# Patient Record
Sex: FEMALE | Race: WHITE | NOT HISPANIC OR LATINO | Employment: PART TIME | URBAN - METROPOLITAN AREA
[De-identification: names, ages, dates, MRNs, and addresses within clinical notes are randomized per-mention and may not be internally consistent; named-entity substitution may affect disease eponyms.]

---

## 2024-08-21 ENCOUNTER — TELEPHONE (OUTPATIENT)
Dept: FAMILY MEDICINE | Facility: CLINIC | Age: 34
End: 2024-08-21

## 2024-08-21 ENCOUNTER — OFFICE VISIT (OUTPATIENT)
Dept: FAMILY MEDICINE | Facility: CLINIC | Age: 34
End: 2024-08-21
Payer: COMMERCIAL

## 2024-08-21 VITALS
BODY MASS INDEX: 21.52 KG/M2 | WEIGHT: 126.06 LBS | DIASTOLIC BLOOD PRESSURE: 72 MMHG | HEART RATE: 83 BPM | SYSTOLIC BLOOD PRESSURE: 102 MMHG | RESPIRATION RATE: 18 BRPM | OXYGEN SATURATION: 100 % | TEMPERATURE: 97.3 F | HEIGHT: 64 IN

## 2024-08-21 DIAGNOSIS — F41.8 DEPRESSION WITH ANXIETY: ICD-10-CM

## 2024-08-21 DIAGNOSIS — Z76.89 ENCOUNTER TO ESTABLISH CARE: Primary | ICD-10-CM

## 2024-08-21 PROCEDURE — 99203 OFFICE O/P NEW LOW 30 MIN: CPT | Performed by: INTERNAL MEDICINE

## 2024-08-21 RX ORDER — VENLAFAXINE HYDROCHLORIDE 150 MG/1
150 CAPSULE, EXTENDED RELEASE ORAL DAILY
COMMUNITY
Start: 2024-08-21

## 2024-08-21 RX ORDER — HYDROXYZINE HYDROCHLORIDE 25 MG/1
25 TABLET, FILM COATED ORAL DAILY PRN
COMMUNITY
Start: 2024-08-21

## 2024-08-21 ASSESSMENT — PAIN SCALES - GENERAL: PAINLEVEL: NO PAIN (0)

## 2024-08-21 NOTE — TELEPHONE ENCOUNTER
Please call patient. I received message from our neuropsych department that medical clinics do not do evaluation for courts. Court ordered evaluations falls under Forensic Psychology. Here is contact information from [place] Department of Human Services regarding forensic services including forensic mental health program:     Forensic Services General Assistance: [phone].  Forensic Mental Health Program: [phone].    She can contact them for guidance on where to get the test done.

## 2024-08-21 NOTE — PROGRESS NOTES
Assessment & Plan     Rosi was seen today for referral and establish care.    Diagnoses and all orders for this visit:    Encounter to establish care    Depression with anxiety  -     Adult Mental Health  Referral; Future      Patient only wanted referral for neuropsych testing.  Does not have any other primary care preventive needs.  She does not need anything else to be addressed.    No follow-up appointment scheduled.    Subjective   Rosi is a 33 year old, presenting for the following health issues:  Referral (Neuro-psychological) and Establish Care        8/21/2024     8:06 AM   Additional Questions   Roomed by Allison BURKS   Accompanied by self       Patient has a history of depression/anxiety, follows with psychiatry Tasia Middleton CNP.  She takes venlafaxine 150 mg daily.  Depression/anxiety well-controlled.  Uses hydroxyzine as needed for severe anxiety, she rarely uses it.  Through core parenting evaluation, there was a concern raised regarding autism.  The recommendation is for her to get for neuropsych testing including evaluation of autism.  Patient would like to get a referral.  She sees OB/GYN for preventative care.  She does not need medication refills.  She has no other concerns at this time.    History of Present Illness       Reason for visit:  To get an internal referral for a court ordered neuro-psychological assessment including for autism.    She eats 0-1 servings of fruits and vegetables daily.She consumes 3 sweetened beverage(s) daily.She exercises with enough effort to increase her heart rate 10 to 19 minutes per day.  She exercises with enough effort to increase her heart rate 3 or less days per week. She is missing 1 dose(s) of medications per week.  She is not taking prescribed medications regularly due to remembering to take.         Depression and Anxiety   How are you doing with your depression since your last visit? No change  How are you doing with your anxiety since your  "last visit?  No change  Are you having other symptoms that might be associated with depression or anxiety? No  Have you had a significant life event? Financial Concerns and Housing Concerns   Do you have any concerns with your use of alcohol or other drugs? No    Social History     Tobacco Use    Smoking status: Former     Types: Cigarettes     Passive exposure: Past    Smokeless tobacco: Never   Substance Use Topics    Alcohol use: Not Currently    Drug use: Never       Suicide Assessment Five-step Evaluation and Treatment (SAFE-T)    How many servings of fruits and vegetables do you eat daily?  2-3  On average, how many sweetened beverages do you drink each day (Examples: soda, juice, sweet tea, etc.  Do NOT count diet or artificially sweetened beverages)?   6  How many days per week do you exercise enough to make your heart beat faster? 3 or less  How many minutes a day do you exercise enough to make your heart beat faster? 10 - 19  How many days per week do you miss taking your medication? 1  What makes it hard for you to take your medications?  remembering to take            Objective    /72 (BP Location: Right arm, Patient Position: Sitting, Cuff Size: Adult Regular)   Pulse 83   Temp 97.3  F (36.3  C) (Oral)   Resp 18   Ht 1.613 m (5' 3.5\")   Wt 57.2 kg (126 lb 1 oz)   LMP 08/08/2024   SpO2 100%   BMI 21.98 kg/m    Body mass index is 21.98 kg/m .  Physical Exam   GENERAL: alert and no distress            Signed Electronically by: Jo Ann Ingram MD PhD    "

## 2024-09-03 ENCOUNTER — TELEPHONE (OUTPATIENT)
Dept: FAMILY MEDICINE | Facility: CLINIC | Age: 34
End: 2024-09-03
Payer: COMMERCIAL

## 2024-09-03 NOTE — TELEPHONE ENCOUNTER
Please follow up with patient on which group needs the neuropsych referral.   See telephone encounter on 8/21/204.    The referral was neuropsych referral. The diagnosis for which she was referred was depression/anxiety. She doesn't currently has other diagnosis.     Our neuropsych department has already indicated that they do not do eval for court order.     Please call patient. I received message from our neuropsych department that medical clinics do not do evaluation for courts. Court ordered evaluations falls under Forensic Psychology. Here is contact information from [place] Department of Human Services regarding forensic services including forensic mental health program:      Forensic Services General Assistance: [phone].  Forensic Mental Health Program: [phone].          If it is the forensic service that needs the neuropsych referral, it needs to come from the court order.

## 2024-09-03 NOTE — TELEPHONE ENCOUNTER
Writer called pt and notified her of provider's message. Pt stated it was for forensic. Writer let her know we don't do that on a clinic level and that she will have to contact the number given for that. Pt verbalized understanding and had no questions at the moment.

## 2024-09-03 NOTE — TELEPHONE ENCOUNTER
Order/Referral Request    Who is requesting: PATIENT    Orders being requested: Neuropsyc     Reason service is needed/diagnosis: Neuropsyc     When are orders needed by: ASAP    Has this been discussed with Provider: Yes- PER PATIENT : I DO HAVE AN ORDER IN PLACE BUT WHEN I CALLED AND GOT TRANSFER -[phone] THEY TOLD ME THEY REJECTED MY REFERRAL WAS DUE TO DEPRESSION AND ANXIETY AND I NEED NEUROPSYC EVAL INSTEAD.     Does patient have a preference on a Group/Provider/Facility? [facility]    Does patient have an appointment scheduled?: No    Where to send orders: Place orders within Epic    Could we send this information to you in [facility] or would you prefer to receive a phone call?:   Patient would prefer a phone call   Okay to leave a detailed message?: Yes at Cell number on file:    Telephone Information:   Mobile [phone]

## 2024-10-06 ENCOUNTER — HEALTH MAINTENANCE LETTER (OUTPATIENT)
Age: 34
End: 2024-10-06